# Patient Record
Sex: FEMALE | Race: OTHER | NOT HISPANIC OR LATINO | URBAN - METROPOLITAN AREA
[De-identification: names, ages, dates, MRNs, and addresses within clinical notes are randomized per-mention and may not be internally consistent; named-entity substitution may affect disease eponyms.]

---

## 2017-01-27 ENCOUNTER — INPATIENT (INPATIENT)
Facility: HOSPITAL | Age: 47
LOS: 1 days | Discharge: HOME | End: 2017-01-29
Attending: INTERNAL MEDICINE

## 2017-06-27 DIAGNOSIS — I10 ESSENTIAL (PRIMARY) HYPERTENSION: ICD-10-CM

## 2017-06-27 DIAGNOSIS — E78.5 HYPERLIPIDEMIA, UNSPECIFIED: ICD-10-CM

## 2017-06-27 DIAGNOSIS — I48.0 PAROXYSMAL ATRIAL FIBRILLATION: ICD-10-CM

## 2017-06-27 DIAGNOSIS — R07.9 CHEST PAIN, UNSPECIFIED: ICD-10-CM

## 2017-06-27 DIAGNOSIS — R07.89 OTHER CHEST PAIN: ICD-10-CM

## 2017-06-27 DIAGNOSIS — Z88.0 ALLERGY STATUS TO PENICILLIN: ICD-10-CM

## 2017-10-10 ENCOUNTER — OUTPATIENT (OUTPATIENT)
Dept: OUTPATIENT SERVICES | Facility: HOSPITAL | Age: 47
LOS: 1 days | Discharge: HOME | End: 2017-10-10

## 2017-10-10 DIAGNOSIS — M25.561 PAIN IN RIGHT KNEE: ICD-10-CM

## 2017-10-10 DIAGNOSIS — Z01.818 ENCOUNTER FOR OTHER PREPROCEDURAL EXAMINATION: ICD-10-CM

## 2017-10-10 DIAGNOSIS — I10 ESSENTIAL (PRIMARY) HYPERTENSION: ICD-10-CM

## 2017-10-10 DIAGNOSIS — R07.9 CHEST PAIN, UNSPECIFIED: ICD-10-CM

## 2017-10-16 ENCOUNTER — OUTPATIENT (OUTPATIENT)
Dept: OUTPATIENT SERVICES | Facility: HOSPITAL | Age: 47
LOS: 1 days | Discharge: HOME | End: 2017-10-16

## 2017-10-16 DIAGNOSIS — R07.9 CHEST PAIN, UNSPECIFIED: ICD-10-CM

## 2017-10-16 DIAGNOSIS — I10 ESSENTIAL (PRIMARY) HYPERTENSION: ICD-10-CM

## 2017-10-26 DIAGNOSIS — M65.861 OTHER SYNOVITIS AND TENOSYNOVITIS, RIGHT LOWER LEG: ICD-10-CM

## 2017-10-26 DIAGNOSIS — M23.261 DERANGEMENT OF OTHER LATERAL MENISCUS DUE TO OLD TEAR OR INJURY, RIGHT KNEE: ICD-10-CM

## 2017-10-26 DIAGNOSIS — E78.00 PURE HYPERCHOLESTEROLEMIA, UNSPECIFIED: ICD-10-CM

## 2017-10-26 DIAGNOSIS — Z88.0 ALLERGY STATUS TO PENICILLIN: ICD-10-CM

## 2017-10-26 DIAGNOSIS — I10 ESSENTIAL (PRIMARY) HYPERTENSION: ICD-10-CM

## 2019-10-16 ENCOUNTER — EMERGENCY (EMERGENCY)
Facility: HOSPITAL | Age: 49
LOS: 0 days | Discharge: HOME | End: 2019-10-17
Admitting: EMERGENCY MEDICINE
Payer: COMMERCIAL

## 2019-10-16 VITALS
DIASTOLIC BLOOD PRESSURE: 93 MMHG | SYSTOLIC BLOOD PRESSURE: 168 MMHG | WEIGHT: 134.92 LBS | TEMPERATURE: 97 F | RESPIRATION RATE: 18 BRPM | HEIGHT: 63 IN | HEART RATE: 87 BPM | OXYGEN SATURATION: 100 %

## 2019-10-16 DIAGNOSIS — M25.571 PAIN IN RIGHT ANKLE AND JOINTS OF RIGHT FOOT: ICD-10-CM

## 2019-10-16 DIAGNOSIS — M25.561 PAIN IN RIGHT KNEE: ICD-10-CM

## 2019-10-16 DIAGNOSIS — M25.579 PAIN IN UNSPECIFIED ANKLE AND JOINTS OF UNSPECIFIED FOOT: ICD-10-CM

## 2019-10-16 PROCEDURE — 99284 EMERGENCY DEPT VISIT MOD MDM: CPT

## 2019-10-17 LAB — D DIMER BLD IA.RAPID-MCNC: 78 NG/ML DDU — SIGNIFICANT CHANGE UP (ref 0–230)

## 2019-10-17 NOTE — ED PROVIDER NOTE - PATIENT PORTAL LINK FT
You can access the FollowMyHealth Patient Portal offered by [facility] by registering at the following website: [email or web address]. By joining [employer]’s FollowMyHealth portal, you will also be able to view your health information using other applications (apps) compatible with our system.

## 2019-10-17 NOTE — ED PROVIDER NOTE - PHYSICAL EXAMINATION
VITAL SIGNS: I have reviewed nursing notes and confirm.  CONSTITUTIONAL: Well-developed; well-nourished; in no acute distress.  SKIN: Skin exam is warm and dry, no acute rash.  HEAD: Normocephalic; atraumatic.  EYES: PERRL, EOM intact; conjunctiva and sclera clear.  ENT: No nasal discharge; airway clear. TMs clear.  NECK: Supple; non tender.  CARD: S1, S2 normal; no murmurs, gallops, or rubs. Regular rate and rhythm.  RESP: No wheezes, rales or rhonchi. Speaking in full sentences.   ABD: Normal bowel sounds; soft; non-distended; non-tender; No rebound or guarding. No CVA tenderness.  EXT: Normal ROM. (+) Mild swelling to right ankle around left lateral malleolus without deformity, ecchymosis, erythema or warmth. FROM. No right knee TTP or deformity. DP 2+  NEURO: Alert, oriented. Grossly unremarkable. No focal deficits. VITAL SIGNS: I have reviewed nursing notes and confirm.  CONSTITUTIONAL: Well-developed; well-nourished; in no acute distress.  SKIN: Skin exam is warm and dry, no acute rash.  HEAD: Normocephalic; atraumatic.  EYES: Conjunctiva and sclera clear.  CARD: S1, S2 normal; no murmurs, gallops, or rubs. Regular rate and rhythm.  RESP: No wheezes, rales or rhonchi. Speaking in full sentences.   EXT: Normal ROM. (+) Mild swelling to right ankle around left lateral malleolus without deformity, ecchymosis, erythema or warmth. FROM. No right knee TTP or deformity. DP 2+. Sensation intact.   NEURO: Alert, oriented. Grossly unremarkable. No focal deficits.

## 2019-10-17 NOTE — ED PROVIDER NOTE - NS ED ROS FT
Review of Systems  Constitutional:  No fever, chills.  Cardiac:  No chest pain, palpitations, syncope.  Resp: No SOB.   MS:  (+) right ankle and knee pain  Skin:  No skin rash, pruritis, jaundice, or lesions.  Neuro:  No headache, dizziness, loss of sensation, or focal weakness.  No change in mental status.   Endocrine: No history of thyroid disease or diabetes.

## 2019-10-17 NOTE — ED PROVIDER NOTE - CARE PROVIDER_API CALL
Stephon Alcaraz)  Orthopaedic Surgery  [address] [place] [address]  Phone: [phone]  Fax: [phone]  Follow Up Time: 4-6 Days

## 2019-10-17 NOTE — ED PROVIDER NOTE - OBJECTIVE STATEMENT
50 yo F with no significant PMHx presents to the ED c/o mild right knee and ankle pain that has been intermittent for the past few months. Pt has been following with ortho and has had normal xrays. Pt is do to get MRI but presented to ED because she noticed some swelling around her ankle and was concerned for blood clot. Pt denies other complaints. Pt denies fever, chills, chest pain, SOB, LOC, trauma, calf pain/swelling, recent travel, recent surgery.

## 2019-10-17 NOTE — ED PROVIDER NOTE - CLINICAL SUMMARY MEDICAL DECISION MAKING FREE TEXT BOX
Pt presents with right ankle swelling with concern for DVT. D-dimer normal. Pt instructed to follow-up with ortho for MRI. Return precautions provided. Pt agreeable to d/c.

## 2019-11-12 ENCOUNTER — EMERGENCY (EMERGENCY)
Facility: HOSPITAL | Age: 49
LOS: 0 days | Discharge: HOME | End: 2019-11-13
Attending: EMERGENCY MEDICINE | Admitting: EMERGENCY MEDICINE
Payer: COMMERCIAL

## 2019-11-12 VITALS
SYSTOLIC BLOOD PRESSURE: 134 MMHG | DIASTOLIC BLOOD PRESSURE: 80 MMHG | WEIGHT: 138.01 LBS | HEART RATE: 79 BPM | OXYGEN SATURATION: 100 % | RESPIRATION RATE: 18 BRPM | TEMPERATURE: 97 F | HEIGHT: 63 IN

## 2019-11-12 DIAGNOSIS — R30.0 DYSURIA: ICD-10-CM

## 2019-11-12 DIAGNOSIS — M54.5 LOW BACK PAIN: ICD-10-CM

## 2019-11-12 DIAGNOSIS — R10.31 RIGHT LOWER QUADRANT PAIN: ICD-10-CM

## 2019-11-12 DIAGNOSIS — R50.9 FEVER, UNSPECIFIED: ICD-10-CM

## 2019-11-12 LAB
BASOPHILS # BLD AUTO: 0.06 K/UL — SIGNIFICANT CHANGE UP (ref 0–0.2)
BASOPHILS NFR BLD AUTO: 1.2 % — HIGH (ref 0–1)
EOSINOPHIL # BLD AUTO: 0.15 K/UL — SIGNIFICANT CHANGE UP (ref 0–0.7)
EOSINOPHIL NFR BLD AUTO: 2.9 % — SIGNIFICANT CHANGE UP (ref 0–8)
HCT VFR BLD CALC: 41.3 % — SIGNIFICANT CHANGE UP (ref 37–47)
HGB BLD-MCNC: 13.4 G/DL — SIGNIFICANT CHANGE UP (ref 12–16)
IMM GRANULOCYTES NFR BLD AUTO: 0 % — LOW (ref 0.1–0.3)
LYMPHOCYTES # BLD AUTO: 1.48 K/UL — SIGNIFICANT CHANGE UP (ref 1.2–3.4)
LYMPHOCYTES # BLD AUTO: 28.5 % — SIGNIFICANT CHANGE UP (ref 20.5–51.1)
MCHC RBC-ENTMCNC: 27.7 PG — SIGNIFICANT CHANGE UP (ref 27–31)
MCHC RBC-ENTMCNC: 32.4 G/DL — SIGNIFICANT CHANGE UP (ref 32–37)
MCV RBC AUTO: 85.5 FL — SIGNIFICANT CHANGE UP (ref 81–99)
MONOCYTES # BLD AUTO: 0.47 K/UL — SIGNIFICANT CHANGE UP (ref 0.1–0.6)
MONOCYTES NFR BLD AUTO: 9.1 % — SIGNIFICANT CHANGE UP (ref 1.7–9.3)
NEUTROPHILS # BLD AUTO: 3.03 K/UL — SIGNIFICANT CHANGE UP (ref 1.4–6.5)
NEUTROPHILS NFR BLD AUTO: 58.3 % — SIGNIFICANT CHANGE UP (ref 42.2–75.2)
NRBC # BLD: 0 /100 WBCS — SIGNIFICANT CHANGE UP (ref 0–0)
PLATELET # BLD AUTO: 288 K/UL — SIGNIFICANT CHANGE UP (ref 130–400)
RBC # BLD: 4.83 M/UL — SIGNIFICANT CHANGE UP (ref 4.2–5.4)
RBC # FLD: 13.1 % — SIGNIFICANT CHANGE UP (ref 11.5–14.5)
WBC # BLD: 5.19 K/UL — SIGNIFICANT CHANGE UP (ref 4.8–10.8)
WBC # FLD AUTO: 5.19 K/UL — SIGNIFICANT CHANGE UP (ref 4.8–10.8)

## 2019-11-12 PROCEDURE — 99284 EMERGENCY DEPT VISIT MOD MDM: CPT

## 2019-11-12 RX ORDER — KETOROLAC TROMETHAMINE 30 MG/ML
15 SYRINGE (ML) INJECTION ONCE
Refills: 0 | Status: DISCONTINUED | OUTPATIENT
Start: 2019-11-12 | End: 2019-11-12

## 2019-11-12 RX ORDER — METHOCARBAMOL 500 MG/1
1500 TABLET, FILM COATED ORAL ONCE
Refills: 0 | Status: COMPLETED | OUTPATIENT
Start: 2019-11-12 | End: 2019-11-12

## 2019-11-12 RX ORDER — SODIUM CHLORIDE 9 MG/ML
1000 INJECTION INTRAMUSCULAR; INTRAVENOUS; SUBCUTANEOUS ONCE
Refills: 0 | Status: COMPLETED | OUTPATIENT
Start: 2019-11-12 | End: 2019-11-12

## 2019-11-12 RX ADMIN — SODIUM CHLORIDE 1000 MILLILITER(S): 9 INJECTION INTRAMUSCULAR; INTRAVENOUS; SUBCUTANEOUS at 23:39

## 2019-11-12 RX ADMIN — Medication 15 MILLIGRAM(S): at 23:35

## 2019-11-12 RX ADMIN — METHOCARBAMOL 1500 MILLIGRAM(S): 500 TABLET, FILM COATED ORAL at 23:35

## 2019-11-13 LAB
ALBUMIN SERPL ELPH-MCNC: 4.2 G/DL — SIGNIFICANT CHANGE UP (ref 3.5–5.2)
ALP SERPL-CCNC: 96 U/L — SIGNIFICANT CHANGE UP (ref 30–115)
ALT FLD-CCNC: 14 U/L — SIGNIFICANT CHANGE UP (ref 0–41)
ANION GAP SERPL CALC-SCNC: 14 MMOL/L — SIGNIFICANT CHANGE UP (ref 7–14)
APPEARANCE UR: CLEAR — SIGNIFICANT CHANGE UP
AST SERPL-CCNC: 16 U/L — SIGNIFICANT CHANGE UP (ref 0–41)
BILIRUB DIRECT SERPL-MCNC: <0.2 MG/DL — SIGNIFICANT CHANGE UP (ref 0–0.2)
BILIRUB INDIRECT FLD-MCNC: <0.2 MG/DL — SIGNIFICANT CHANGE UP (ref 0.2–1.2)
BILIRUB SERPL-MCNC: <0.2 MG/DL — SIGNIFICANT CHANGE UP (ref 0.2–1.2)
BILIRUB UR-MCNC: NEGATIVE — SIGNIFICANT CHANGE UP
BUN SERPL-MCNC: 13 MG/DL — SIGNIFICANT CHANGE UP (ref 10–20)
CALCIUM SERPL-MCNC: 9 MG/DL — SIGNIFICANT CHANGE UP (ref 8.5–10.1)
CHLORIDE SERPL-SCNC: 100 MMOL/L — SIGNIFICANT CHANGE UP (ref 98–110)
CO2 SERPL-SCNC: 24 MMOL/L — SIGNIFICANT CHANGE UP (ref 17–32)
COLOR SPEC: YELLOW — SIGNIFICANT CHANGE UP
CREAT SERPL-MCNC: 0.7 MG/DL — SIGNIFICANT CHANGE UP (ref 0.7–1.5)
DIFF PNL FLD: NEGATIVE — SIGNIFICANT CHANGE UP
GLUCOSE SERPL-MCNC: 168 MG/DL — HIGH (ref 70–99)
GLUCOSE UR QL: NEGATIVE — SIGNIFICANT CHANGE UP
HCG SERPL QL: NEGATIVE — SIGNIFICANT CHANGE UP
KETONES UR-MCNC: SIGNIFICANT CHANGE UP
LACTATE SERPL-SCNC: 2.7 MMOL/L — HIGH (ref 0.5–2.2)
LEUKOCYTE ESTERASE UR-ACNC: NEGATIVE — SIGNIFICANT CHANGE UP
LIDOCAIN IGE QN: 86 U/L — HIGH (ref 7–60)
NITRITE UR-MCNC: NEGATIVE — SIGNIFICANT CHANGE UP
PH UR: 6 — SIGNIFICANT CHANGE UP (ref 5–8)
POTASSIUM SERPL-MCNC: 3.8 MMOL/L — SIGNIFICANT CHANGE UP (ref 3.5–5)
POTASSIUM SERPL-SCNC: 3.8 MMOL/L — SIGNIFICANT CHANGE UP (ref 3.5–5)
PROT SERPL-MCNC: 7 G/DL — SIGNIFICANT CHANGE UP (ref 6–8)
PROT UR-MCNC: NEGATIVE — SIGNIFICANT CHANGE UP
SODIUM SERPL-SCNC: 138 MMOL/L — SIGNIFICANT CHANGE UP (ref 135–146)
SP GR SPEC: 1.02 — SIGNIFICANT CHANGE UP (ref 1.01–1.02)
UROBILINOGEN FLD QL: SIGNIFICANT CHANGE UP

## 2019-11-13 PROCEDURE — 74177 CT ABD & PELVIS W/CONTRAST: CPT | Mod: 26

## 2019-11-13 RX ORDER — TIZANIDINE 4 MG/1
2 TABLET ORAL
Qty: 42 | Refills: 0
Start: 2019-11-13 | End: 2019-11-19

## 2019-11-13 NOTE — ED PROVIDER NOTE - PATIENT PORTAL LINK FT
You can access the FollowMyHealth Patient Portal offered by [facility] by registering at the following website: [email or web address]. By joining [employer]’s FollowMyHealth portal, you will also be able to view your health information using other applications (apps) compatible with our system.

## 2019-11-13 NOTE — ED PROVIDER NOTE - OBJECTIVE STATEMENT
49 year old F with no pmhx c/o rt lower back pain radiating to RLQ x 2 days. Symptoms are constant without any relieving / alleviating factors. Also notes subjective fever/chills, and dysuria. No trauma/injuries, inability to ambulate, nausea, vomiting, sick contacts, chest pain, sob, recent travel, hx of renal stones/uti.

## 2019-11-13 NOTE — ED PROVIDER NOTE - NS ED ROS FT
Constitutional: no fever, chills, no recent weight loss, change in appetite or malaise  Eyes: no redness/discharge/pain/vision changes  Cardiac: No chest pain, SOB or edema.  Respiratory: No cough or respiratory distress  GI: + RLQ pain. No nausea, vomiting, diarrhea   : + dysuria. No frequency, urgency or hematuria  MS: + Rt lower back pain.  no loss of ROM, no weakness  Neuro: No headache or weakness. No LOC.  Skin: No skin rash.  Endocrine: No history of thyroid disease or diabetes.  Except as documented in the HPI, all other systems are negative.

## 2019-11-13 NOTE — ED PROVIDER NOTE - NSDCPRINTRESULTS_ED_ALL_ED
Normal rate, regular rhythm, normal S1, S2 heart sounds heard. Patient requests all Lab and Radiology Results on their Discharge Instructions

## 2019-11-13 NOTE — ED PROVIDER NOTE - ATTENDING CONTRIBUTION TO CARE
I personally evaluated the patient. I reviewed the Resident’s or Physician Assistant’s note (as assigned above), and agree with the findings and plan except as documented in my note.    49 year old F with no pmhx c/o rt lower back pain radiating to RLQ x 2 days. No dysuria or hematuria. No n/v. No flank pain. No fevers, chills.     CONSTITUTIONAL: Well-developed; well-nourished; in no acute distress. Sitting up and providing appropriate history and physical examination  SKIN: skin exam is warm and dry, no acute rash.  HEAD: Normocephalic; atraumatic.  EYES: PERRL, 3 mm bilateral, no nystagmus, EOM intact; conjunctiva and sclera clear.  ENT: No nasal discharge; airway clear.  NECK: Supple; non tender.+ full passive ROM in all directions. No JVD  CARD: S1, S2 normal; no murmurs, gallops, or rubs. Regular rate and rhythm. + Symmetric Strong Pulses  RESP: No wheezes, rales or rhonchi. Good air movement bilaterally  ABD: soft; non-distended; non-tender. No Rebound, No Gaurding, No signs of peritnitis, No CVA tenderness  EXT: Normal ROM. No clubbing, cyanosis or edema. Dp and Pt Pulses intact. Cap refill less than 3 seconds  NEURO: CN 2-12 intact, normal finger to nose, normal romberg, stable gait, no sensory or motor deficits, Alert, oriented, grossly unremarkable. No Focal deficits. GCS 15. NIH 0  PSYCH: Cooperative, appropriate.

## 2019-11-13 NOTE — ED PROVIDER NOTE - PHYSICAL EXAMINATION
CONSTITUTIONAL: Well-appearing; well-nourished; in no apparent distress.   NECK: Supple; non-tender; no cervical lymphadenopathy.   CARDIOVASCULAR: Normal S1, S2; no murmurs, rubs, or gallops.   RESPIRATORY: Normal chest excursion with respiration; breath sounds clear and equal bilaterally; no wheezes, rhonchi, or rales.  GI/: Normal bowel sounds; non-distended; non-tender; no palpable organomegaly.   MS: No evidence of trauma or deformity. + mild ttp over rt lower lumbar paraspinal region. No midline spinal ttp.  Normal ROM in all four extremities; non-tender to palpation; distal pulses are normal.   SKIN: Normal for age and race; warm; dry; good turgor; no apparent lesions or exudate.   NEURO/PSYCH: A & O x 4; grossly unremarkable. mood and manner are appropriate. Grooming and personal hygiene are appropriate. No apparent thoughts of harm to self or others.

## 2019-12-15 ENCOUNTER — EMERGENCY (EMERGENCY)
Facility: HOSPITAL | Age: 49
LOS: 0 days | Discharge: HOME | End: 2019-12-15
Attending: EMERGENCY MEDICINE | Admitting: EMERGENCY MEDICINE
Payer: COMMERCIAL

## 2019-12-15 VITALS
HEART RATE: 81 BPM | RESPIRATION RATE: 18 BRPM | OXYGEN SATURATION: 98 % | SYSTOLIC BLOOD PRESSURE: 146 MMHG | TEMPERATURE: 98 F | DIASTOLIC BLOOD PRESSURE: 90 MMHG | WEIGHT: 138.01 LBS

## 2019-12-15 VITALS
HEART RATE: 80 BPM | OXYGEN SATURATION: 99 % | SYSTOLIC BLOOD PRESSURE: 130 MMHG | TEMPERATURE: 98 F | RESPIRATION RATE: 18 BRPM | DIASTOLIC BLOOD PRESSURE: 80 MMHG

## 2019-12-15 DIAGNOSIS — R11.0 NAUSEA: ICD-10-CM

## 2019-12-15 DIAGNOSIS — R10.9 UNSPECIFIED ABDOMINAL PAIN: ICD-10-CM

## 2019-12-15 DIAGNOSIS — R10.13 EPIGASTRIC PAIN: ICD-10-CM

## 2019-12-15 LAB
ALBUMIN SERPL ELPH-MCNC: 4 G/DL — SIGNIFICANT CHANGE UP (ref 3.5–5.2)
ALP SERPL-CCNC: 73 U/L — SIGNIFICANT CHANGE UP (ref 30–115)
ALT FLD-CCNC: 15 U/L — SIGNIFICANT CHANGE UP (ref 0–41)
ANION GAP SERPL CALC-SCNC: 12 MMOL/L — SIGNIFICANT CHANGE UP (ref 7–14)
AST SERPL-CCNC: 16 U/L — SIGNIFICANT CHANGE UP (ref 0–41)
BASOPHILS # BLD AUTO: 0.06 K/UL — SIGNIFICANT CHANGE UP (ref 0–0.2)
BASOPHILS NFR BLD AUTO: 1.8 % — HIGH (ref 0–1)
BILIRUB SERPL-MCNC: 0.3 MG/DL — SIGNIFICANT CHANGE UP (ref 0.2–1.2)
BUN SERPL-MCNC: 16 MG/DL — SIGNIFICANT CHANGE UP (ref 10–20)
CALCIUM SERPL-MCNC: 8.8 MG/DL — SIGNIFICANT CHANGE UP (ref 8.5–10.1)
CHLORIDE SERPL-SCNC: 102 MMOL/L — SIGNIFICANT CHANGE UP (ref 98–110)
CO2 SERPL-SCNC: 25 MMOL/L — SIGNIFICANT CHANGE UP (ref 17–32)
CREAT SERPL-MCNC: 0.6 MG/DL — LOW (ref 0.7–1.5)
EOSINOPHIL # BLD AUTO: 0 K/UL — SIGNIFICANT CHANGE UP (ref 0–0.7)
EOSINOPHIL NFR BLD AUTO: 0 % — SIGNIFICANT CHANGE UP (ref 0–8)
GLUCOSE SERPL-MCNC: 103 MG/DL — HIGH (ref 70–99)
HCG SERPL QL: NEGATIVE — SIGNIFICANT CHANGE UP
HCT VFR BLD CALC: 42 % — SIGNIFICANT CHANGE UP (ref 37–47)
HGB BLD-MCNC: 13.4 G/DL — SIGNIFICANT CHANGE UP (ref 12–16)
LIDOCAIN IGE QN: 64 U/L — HIGH (ref 7–60)
LYMPHOCYTES # BLD AUTO: 0.73 K/UL — LOW (ref 1.2–3.4)
LYMPHOCYTES # BLD AUTO: 23 % — SIGNIFICANT CHANGE UP (ref 20.5–51.1)
MAGNESIUM SERPL-MCNC: 2.3 MG/DL — SIGNIFICANT CHANGE UP (ref 1.8–2.4)
MCHC RBC-ENTMCNC: 27.5 PG — SIGNIFICANT CHANGE UP (ref 27–31)
MCHC RBC-ENTMCNC: 31.9 G/DL — LOW (ref 32–37)
MCV RBC AUTO: 86.1 FL — SIGNIFICANT CHANGE UP (ref 81–99)
MONOCYTES # BLD AUTO: 0.56 K/UL — SIGNIFICANT CHANGE UP (ref 0.1–0.6)
MONOCYTES NFR BLD AUTO: 17.7 % — HIGH (ref 1.7–9.3)
NEUTROPHILS # BLD AUTO: 1.62 K/UL — SIGNIFICANT CHANGE UP (ref 1.4–6.5)
NEUTROPHILS NFR BLD AUTO: 51.3 % — SIGNIFICANT CHANGE UP (ref 42.2–75.2)
NRBC # BLD: 2 /100 — HIGH (ref 0–0)
NRBC # BLD: SIGNIFICANT CHANGE UP /100 WBCS (ref 0–0)
PLAT MORPH BLD: SIGNIFICANT CHANGE UP
PLATELET # BLD AUTO: 250 K/UL — SIGNIFICANT CHANGE UP (ref 130–400)
POTASSIUM SERPL-MCNC: 3.6 MMOL/L — SIGNIFICANT CHANGE UP (ref 3.5–5)
POTASSIUM SERPL-SCNC: 3.6 MMOL/L — SIGNIFICANT CHANGE UP (ref 3.5–5)
PROT SERPL-MCNC: 6.9 G/DL — SIGNIFICANT CHANGE UP (ref 6–8)
RBC # BLD: 4.88 M/UL — SIGNIFICANT CHANGE UP (ref 4.2–5.4)
RBC # FLD: 13.3 % — SIGNIFICANT CHANGE UP (ref 11.5–14.5)
RBC BLD AUTO: NORMAL — SIGNIFICANT CHANGE UP
SODIUM SERPL-SCNC: 139 MMOL/L — SIGNIFICANT CHANGE UP (ref 135–146)
VARIANT LYMPHS # BLD: 6.2 % — HIGH (ref 0–5)
WBC # BLD: 3.16 K/UL — LOW (ref 4.8–10.8)
WBC # FLD AUTO: 3.16 K/UL — LOW (ref 4.8–10.8)

## 2019-12-15 PROCEDURE — 99284 EMERGENCY DEPT VISIT MOD MDM: CPT

## 2019-12-15 PROCEDURE — 76705 ECHO EXAM OF ABDOMEN: CPT | Mod: 26

## 2019-12-15 RX ORDER — ONDANSETRON 8 MG/1
4 TABLET, FILM COATED ORAL ONCE
Refills: 0 | Status: COMPLETED | OUTPATIENT
Start: 2019-12-15 | End: 2019-12-15

## 2019-12-15 RX ORDER — FAMOTIDINE 10 MG/ML
20 INJECTION INTRAVENOUS ONCE
Refills: 0 | Status: COMPLETED | OUTPATIENT
Start: 2019-12-15 | End: 2019-12-15

## 2019-12-15 RX ORDER — FAMOTIDINE 10 MG/ML
1 INJECTION INTRAVENOUS
Qty: 6 | Refills: 0
Start: 2019-12-15 | End: 2019-12-17

## 2019-12-15 RX ORDER — SODIUM CHLORIDE 9 MG/ML
1000 INJECTION, SOLUTION INTRAVENOUS ONCE
Refills: 0 | Status: COMPLETED | OUTPATIENT
Start: 2019-12-15 | End: 2019-12-15

## 2019-12-15 RX ADMIN — ONDANSETRON 4 MILLIGRAM(S): 8 TABLET, FILM COATED ORAL at 15:45

## 2019-12-15 RX ADMIN — FAMOTIDINE 104 MILLIGRAM(S): 10 INJECTION INTRAVENOUS at 15:45

## 2019-12-15 RX ADMIN — SODIUM CHLORIDE 1000 MILLILITER(S): 9 INJECTION, SOLUTION INTRAVENOUS at 15:44

## 2019-12-15 NOTE — ED PROVIDER NOTE - PATIENT PORTAL LINK FT
You can access the FollowMyHealth Patient Portal offered by [facility] by registering at the following website: [email or web address]. By joining [employer]’s FollowMyHealth portal, you will also be able to view your health information using other applications (apps) compatible with our system.

## 2019-12-15 NOTE — ED PROVIDER NOTE - OBJECTIVE STATEMENT
49y F no pmh prsents for eval of abd pain. Pt states she has mild burning epigastric pain x2 days, no aggravating or relieving factors. Associated nausea. Denies ha, fever, cp, sob, weakness, numbness

## 2019-12-15 NOTE — ED PROVIDER NOTE - PROGRESS NOTE DETAILS
Attending Note: 50 y/o F no PMHx p/w abd pain x 3 days. Pt states pain started Friday, 12/13, after she got home from work. Also experienced some vomiting Friday night. Pt describes pain as constant. Decreased PO intake. PE: Well appearing, awake and alert. Cardio – S1S2. Resp - CTAB. Abdomen – (+) TTP upper gastric, RUQ. Ext- no calf swelling. Neuro – grossly unremarkable. Plan: US, labs. Results d/w patient and family and copies of results provided.  Pt instructed to return if any worsening symptoms or concerns.  They verbalize understanding.

## 2019-12-15 NOTE — ED PROVIDER NOTE - PHYSICAL EXAMINATION
CONST: NAD  EYES: PERRL, EOMI, Sclera and conjunctiva clear.   ENT: No nasal discharge. Oropharynx normal appearing, no erythema or exudates. No abscess or swelling. Uvula midline.   NECK: Non-tender, no meningeal signs. normal ROM. supple   CARD: S1 S2; No jvd  RESP: Equal BS B/L, No wheezes, rhonchi or rales. No distress  GI: Soft, non-tender, non-distended. normal BS  MS: Normal ROM in all extremities. pulses 2 +. no calf tenderness or swelling  SKIN: Warm, dry, no acute rashes. Good turgor  NEURO: A&Ox3

## 2019-12-15 NOTE — ED PROVIDER NOTE - CLINICAL SUMMARY MEDICAL DECISION MAKING FREE TEXT BOX
pt presented for upper abd pain.  labs wnl, ruq sono with no cholecystitis. pt felt better after dc home with antiacids ottp f/u

## 2019-12-15 NOTE — ED PROVIDER NOTE - NS ED ROS FT
Constitutional: (-) fever  Eyes/ENT: (-) blurry vision, (-) epistaxis  Cardiovascular: (-) chest pain, (-) syncope  Respiratory: (-) cough, (-) shortness of breath  Gastrointestinal: (+) epigastric pain, (+) nausea, (-) vomiting, (-) diarrhea  Musculoskeletal: (-) neck pain, (-) back pain, (-) joint pain  Integumentary: (-) rash, (-) edema  Neurological: (-) headache, (-) altered mental status  Allergic/Immunologic: (-) pruritus

## 2019-12-15 NOTE — ED PROVIDER NOTE - NSFOLLOWUPINSTRUCTIONS_ED_ALL_ED_FT
Follow up with PMD and GI in 1-2 days.    Abdominal Pain    Many things can cause abdominal pain. Usually, abdominal pain is not caused by a disease and will improve without treatment. Your health care provider will do a physical exam and possibly order blood tests and imaging to help determine the seriousness of your pain. However, in many cases, no cause may be found and you may need further testing as an outpatient. Monitor your abdominal pain for any changes.     SEEK IMMEDIATE MEDICAL CARE IF YOU HAVE THE FOLLOWING SYMPTOMS: worsening abdominal pain, vomiting, diarrhea, inability to have bowel movements or pass gas, black or bloody stool, fever accompanying chest pain or back pain, or dizziness/lightheadedness.

## 2019-12-15 NOTE — ED ADULT NURSE NOTE - OBJECTIVE STATEMENT
pt presents with epigastric pain x 2 days with nausea, vomiting and subjective fever. pt denies chest pain, SOB, or urinary symptoms.

## 2019-12-15 NOTE — ED PROVIDER NOTE - CARE PROVIDER_API CALL
Perlita Rodriguez)  Internal Medicine  [address] [place] [address]  Phone: [phone]  Fax: [phone]  Follow Up Time:

## 2019-12-15 NOTE — ED ADULT NURSE REASSESSMENT NOTE - NS ED NURSE REASSESS COMMENT FT1
pt denies nausea or abdominal pain at this time. No episodes of vomiting noted in the ED since pt's arrival. Safety maintained, will continue to monitor

## 2019-12-25 LAB
ANISOCYTOSIS BLD QL: SLIGHT — SIGNIFICANT CHANGE UP
GIANT PLATELETS BLD QL SMEAR: PRESENT — SIGNIFICANT CHANGE UP
POLYCHROMASIA BLD QL SMEAR: SLIGHT — SIGNIFICANT CHANGE UP
SMUDGE CELLS # BLD: PRESENT — SIGNIFICANT CHANGE UP

## 2020-05-28 PROBLEM — Z78.9 OTHER SPECIFIED HEALTH STATUS: Chronic | Status: ACTIVE | Noted: 2019-12-15

## 2020-05-28 PROBLEM — Z00.00 ENCOUNTER FOR PREVENTIVE HEALTH EXAMINATION: Status: ACTIVE | Noted: 2020-05-28

## 2020-06-12 ENCOUNTER — APPOINTMENT (OUTPATIENT)
Dept: CARDIOLOGY | Facility: CLINIC | Age: 50
End: 2020-06-12

## 2020-06-16 ENCOUNTER — APPOINTMENT (OUTPATIENT)
Dept: BREAST CENTER | Facility: CLINIC | Age: 50
End: 2020-06-16
Payer: COMMERCIAL

## 2020-06-16 VITALS
WEIGHT: 128 LBS | HEIGHT: 63 IN | TEMPERATURE: 98.4 F | BODY MASS INDEX: 22.68 KG/M2 | DIASTOLIC BLOOD PRESSURE: 74 MMHG | SYSTOLIC BLOOD PRESSURE: 118 MMHG

## 2020-06-16 DIAGNOSIS — Q83.1 ACCESSORY BREAST: ICD-10-CM

## 2020-06-16 DIAGNOSIS — R92.8 OTHER ABNORMAL AND INCONCLUSIVE FINDINGS ON DIAGNOSTIC IMAGING OF BREAST: ICD-10-CM

## 2020-06-16 DIAGNOSIS — N60.02 SOLITARY CYST OF LEFT BREAST: ICD-10-CM

## 2020-06-16 DIAGNOSIS — Z78.9 OTHER SPECIFIED HEALTH STATUS: ICD-10-CM

## 2020-06-16 PROCEDURE — 99213 OFFICE O/P EST LOW 20 MIN: CPT

## 2020-06-17 PROBLEM — Z78.9 NO PERTINENT PAST MEDICAL HISTORY: Status: RESOLVED | Noted: 2020-06-17 | Resolved: 2020-06-17

## 2020-06-17 PROBLEM — Z78.9 NO PERTINENT PAST SURGICAL HISTORY: Status: RESOLVED | Noted: 2020-06-17 | Resolved: 2020-06-17

## 2020-06-17 PROBLEM — Z78.9 NO HISTORY OF ALCOHOL USE: Status: ACTIVE | Noted: 2020-06-17

## 2020-06-17 NOTE — HISTORY OF PRESENT ILLNESS
[FreeTextEntry1] : Serenity is a 50 F with a L breast complicated cyst and accessory breast tissue in L axilla. \par \par Her work up was as follows: \par 2020 -- b/l screening mammogram and b/l US \par -extremely dense breasts \par -no suspicious mass, architectural distortion, or calcifications in either breast \par b/l US \par -no suspicious solid/complex cystic mass b/l\par -LEFT @2N5, 0.6 cm anechoic lesion with the appearance of internal debris, suggestive of a complicated cyst \par BIRADS 2\par \par Of note, she does report b/l cyclical breast pain but denies palpating any abnormal masses in either breast.  She denies any nipple discharge or retraction, but does have accessory breast tissue in in her left axilla which also gets swollen and painful during her menses.  \par \par HISTORICAL RISK FACTORS: \par -no prior breast biopsies or surgeries \par -no family history of breast or ovarian cancer \par -, age at first live birth was 28 \par -no prior OCP use \par -no gyn surgeries\par

## 2020-06-17 NOTE — ASSESSMENT
[FreeTextEntry1] : Serenity is a 50 F with a left breast complicated cyst and left accessory breast tissue in axilla. \par \par On exam, she had b/l nondiscrete nodularities palpated throughout both breasts, but no suspicious masses palpated within either breast.  She did have accessory breast tissue b/l axillae, however it is more pronounced on her left axilla. \par \par Her most recent imaging was a b/l screening mammogram and US on 5/19/2020 which revealed in her left breast @2N5, a 0.6 cm complicated cyst, deemed BIRADS 2. \par \par Although her left breast lesion is deemed BIRADS 2, I would like to repeat her left breast US in 6 months to ensure there are no changes to this lesion.  This will be scheduled for her today. \par \par We discussed breast cysts. They are not pre-malignant nor do they have malignant potential and are hormonally influenced.  They may grow or shrink in size as well as resolve spontaneously and there is usually no intervention unless they are symptomatic.  In several large studies, patients with breast cysts and a positive family history had a higher relative risk of breast cancer (from 1.5 to 3).  She is asymptomatic from her left breast cyst so no intervention will be performed at this time.\par \par In regards to the accessory breast tissue in her left axilla, because this is causing her pain, I have referred her to plastic surgery, Dr. Miller for possible surgical excision. \par \par In regards to her breast pain, it may be related to fibrocystic changes within her breast that are hormonally influenced. We spoke about possible interventions including evening primrose oil, supportive bras, and decreasing caffeine intake.  Although none of these have been consistently proven to improve breast pain, they may be tried.  If the pain becomes very severe, there have been studies of tamoxifen being effective for the treatment of breast pain, although there are risks with tamoxifen.  At this time she will try supportive measures.\par \par We discussed dense breasts.  Increasing breast density has been found to increase ones risk of breast cancer, but at this time, there is no clear indication for additional imaging in this setting, as both US and MRI have not been found to improve survival.  One can consider bilateral screening US.  However, out of 1000 women screened, the use of routine US will only identify an additional 3-5 cancers.  The use of US was found to increase the likelihood of undergoing more imaging and more biopsies.  She does have dense breasts.  We have decided to proceed with screening bilateral breast US at this time.  This will be scheduled with her next screening mammogram.\par \par She is otherwise at an average risk for breast cancer and should continue with annual screening mammograms and US. \par \par All of her questions were answered.  She knows to call with any further questions or concerns. \par \par PLAN: \par -L breast US On 11/19/2020 \par -f/up after \par -plastic surgery referral for possible excision of left axillary accessory breast tissue

## 2020-06-17 NOTE — PAST MEDICAL HISTORY
[Menstruating] : The patient is menstruating [Menarche Age ____] : age at menarche was [unfilled] [History of Hormone Replacement Treatment] : has no history of hormone replacement treatment [Definite ___ (Date)] : the last menstrual period was [unfilled] [Regular Cycle Intervals] : have been regular [Normal Amount/Duration] : it was of a normal amount and duration [Total Preg ___] : G[unfilled] [Live Births ___] : P[unfilled]  [Age At Live Birth ___] : Age at live birth: [unfilled] [FreeTextEntry6] : denies [FreeTextEntry5] : denies [FreeTextEntry7] : denies [FreeTextEntry8] : yes x 6 months

## 2020-06-17 NOTE — REVIEW OF SYSTEMS
[Fever] : no fever [Chills] : no chills [Abn Vaginal Bleeding] : no unexplained vaginal bleeding [As Noted in HPI] : as noted in HPI [Skin Wound] : no skin wound [Skin Lesions] : no skin lesions [Breast Pain] : breast pain [Breast Lump] : no breast lump [Hot Flashes] : no hot flashes [Negative] : Psychiatric

## 2020-06-17 NOTE — DATA REVIEWED
[FreeTextEntry1] : 	\par Exam requested by:\par PRINCESS KATZ MD\par [address]\par [facility] [id number]\par SITE PERFORMED: [address]\par SITE PHONE: [phone]\par Patient: CYNDI CROWE\par YOB: 1970\par Phone: [phone]\par MRN: [id number] Acc: [id number]\par Date of Exam: 05-\par  \par EXAM:  DIGITAL BILATERAL SCREENING MAMMOGRAM WITH CAD AND TOMOSYNTHESIS AND BREAST ULTRASOUND\par \par HISTORY:  The patient is 50 years old and is seen for a screening mammogram. There is no personal history of breast cancer. Family history of breast cancer: None. \par \par CLINICAL BREAST EXAMINATION:  The patient reports that her last clinical breast exam was within the past year. \par \par COMPARISON:  The present examination has been compared to prior breast imaging studies dating back to 6/2/2017\par \par MAMMOGRAM:\par \par TECHNIQUE:  The following views were obtained digitally: bilateral craniocaudal, bilateral mediolateral oblique. Low-dose full-field digital breast tomosynthesis examination was performed with 3D acquisitions and C-View synthesized 2D reconstructed images. Computer-aided detection (CAD) was utilized. \par \par FINDINGS: \par BREAST COMPOSITION:  The breasts are extremely dense, which lowers the sensitivity of mammography.\par \par No suspicious masses, architectural distortion, or significant calcifications are detected.\par \par BREAST ULTRASOUND:\par \par HISTORY:  Supplemental screening evaluation of dense breasts.\par \par TECHNIQUE:  A bilateral breast ultrasound was performed with complete evaluation of the four quadrants, retroareolar region, and axilla. \par \par FINDINGS: \par BREAST ECHOTEXTURE:  There is a heterogeneous background echotexture. \par \par No suspicious solid or complex cystic mass is detected in either breast. There is no pathological lymphadenopathy in either axilla. There is a 0.6 cm horizontally oriented anechoic lesion with the appearance of internal debris in the left breast 2 o'clock position 5 cm from the nipple suggestive of a complicated cyst. \par \par IMPRESSION:  No mammographic or ultrasonographic evidence of malignancy. \par \par FOLLOW-UP:  Annual screening.\par \par \par ASSESSMENT:  BI-RADS Category 2:  Benign. \par \par This examination should not preclude the clinical evaluation of a suspicious palpable abnormality.\par \par As per the FDA requirements, a layman's letter has been generated and sent to your patient stating the results and recommendations of this breast imaging study. We have entered your patient into our reminder system and will notify them when they are due for their next breast imaging exam. \par \par Thank you for the opportunity to participate in the care of this patient.  \par  \par ZEYAD EMMANUEL MD  - Electronically Signed: 05- 10:27 AM \par Physician to Physician Direct Line is: [phone]

## 2020-06-17 NOTE — PHYSICAL EXAM
[No dominant masses] : no dominant masses in right breast  [No dominant masses] : no dominant masses left breast [No Nipple Retraction] : no left nipple retraction [No Nipple Discharge] : no left nipple discharge [Normocephalic] : normocephalic [EOMI] : extra ocular movement intact [Atraumatic] : atraumatic [No Supraclavicular Adenopathy] : no supraclavicular adenopathy [No Cervical Adenopathy] : no cervical adenopathy [Examined in the supine and seated position] : examined in the supine and seated position [No Axillary Lymphadenopathy] : no right axillary lymphadenopathy [Not Tender] : non-tender [Soft] : abdomen soft [No Edema] : no edema [No Ulceration] : no ulceration [No Rashes] : no rashes [de-identified] : b/l nonspecific nodularities palpated; but no suspicious masses palpated within either breast; accessory breast tissue in b/l axillae but more pronounced on the left side [de-identified] : acessory breast tissue, no suspicious mass palpated

## 2020-10-08 ENCOUNTER — EMERGENCY (EMERGENCY)
Facility: HOSPITAL | Age: 50
LOS: 0 days | Discharge: HOME | End: 2020-10-08
Attending: EMERGENCY MEDICINE | Admitting: EMERGENCY MEDICINE
Payer: COMMERCIAL

## 2020-10-08 VITALS
WEIGHT: 138.01 LBS | HEIGHT: 63 IN | SYSTOLIC BLOOD PRESSURE: 147 MMHG | OXYGEN SATURATION: 99 % | TEMPERATURE: 98 F | DIASTOLIC BLOOD PRESSURE: 90 MMHG | RESPIRATION RATE: 20 BRPM | HEART RATE: 73 BPM

## 2020-10-08 VITALS
SYSTOLIC BLOOD PRESSURE: 137 MMHG | RESPIRATION RATE: 17 BRPM | OXYGEN SATURATION: 98 % | DIASTOLIC BLOOD PRESSURE: 76 MMHG | HEART RATE: 87 BPM
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
## 2020-10-08 DIAGNOSIS — T78.40XA ALLERGY, UNSPECIFIED, INITIAL ENCOUNTER: ICD-10-CM

## 2020-10-08 DIAGNOSIS — R06.02 SHORTNESS OF BREATH: ICD-10-CM

## 2020-10-08 DIAGNOSIS — T88.6XXA ANAPHYLACTIC REACTION DUE TO ADVERSE EFFECT OF CORRECT DRUG OR MEDICAMENT PROPERLY ADMINISTERED, INITIAL ENCOUNTER: ICD-10-CM
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
## 2020-10-08 PROCEDURE — 99285 EMERGENCY DEPT VISIT HI MDM: CPT

## 2020-10-08 RX ORDER — DEXAMETHASONE 0.5 MG/5ML
10 ELIXIR ORAL ONCE
Refills: 0 | Status: COMPLETED | OUTPATIENT
Start: 2020-10-08 | End: 2020-10-08

## 2020-10-08 RX ORDER — FAMOTIDINE 10 MG/ML
20 INJECTION INTRAVENOUS ONCE
Refills: 0 | Status: COMPLETED | OUTPATIENT
Start: 2020-10-08 | End: 2020-10-08

## 2020-10-08 RX ORDER — EPINEPHRINE 0.3 MG/.3ML
0.3 INJECTION INTRAMUSCULAR; SUBCUTANEOUS ONCE
Refills: 0 | Status: COMPLETED | OUTPATIENT
Start: 2020-10-08 | End: 2020-10-08

## 2020-10-08 RX ORDER — EPINEPHRINE 0.3 MG/.3ML
0.3 INJECTION INTRAMUSCULAR; SUBCUTANEOUS
Qty: 1 | Refills: 0
Start: 2020-10-08

## 2020-10-08 RX ADMIN — FAMOTIDINE 20 MILLIGRAM(S): 10 INJECTION INTRAVENOUS at 02:25

## 2020-10-08 RX ADMIN — EPINEPHRINE 0.3 MILLIGRAM(S): 0.3 INJECTION INTRAMUSCULAR; SUBCUTANEOUS at 02:25

## 2020-10-08 RX ADMIN — Medication 10 MILLIGRAM(S): at 02:23

## 2020-10-08 NOTE — ED PROVIDER NOTE - OBJECTIVE STATEMENT
50y F w/ no sig PMH presents with allergic reaction after taking meloxicam. Acute onset after medication initiation. Facial swelling along with pruritis. No alleviating/worsening factors. Took 2 pills of benadryl prior to arrival. + SOB. Denies fever, chills, CP, abd pain, n/v/d, or numbness/tingling.

## 2020-10-08 NOTE — ED PROVIDER NOTE - NS ED ROS FT
Eyes:  No visual changes, eye pain or discharge.  ENMT:  No hearing changes, pain, no sore throat or runny nose, no difficulty swallowing  Cardiac:  No chest pain, edema. No chest pain with exertion. + SOB  Respiratory:  No cough or respiratory distress. No hemoptysis. No history of asthma or RAD.  GI:  No nausea, vomiting, diarrhea or abdominal pain.  :  No dysuria, frequency or burning.  MS:  No myalgia, muscle weakness, joint pain or back pain.  Neuro:  No headache or weakness.  No LOC.  Skin:  + skin rash.   Endocrine: No history of thyroid disease or diabetes.

## 2020-10-08 NOTE — ED PROVIDER NOTE - PHYSICAL EXAMINATION
CONSTITUTIONAL: Well-developed; well-nourished; in no acute distress.   SKIN: warm, dry  HEAD: Normocephalic; atraumatic.  EYES: PERRL, EOMI, no conjunctival erythema  ENT: No nasal discharge; airway clear. Mild edema of the upper, lower lips. No uvular edema.   NECK: Supple; non tender.  CARD: S1, S2 normal; no murmurs, gallops, or rubs. Regular rate and rhythm.   RESP: No wheezes, rales or rhonchi.  ABD: soft ntnd  EXT: Normal ROM.  No clubbing, cyanosis or edema.   LYMPH: No acute cervical adenopathy.  NEURO: Alert, oriented, grossly unremarkable  PSYCH: Cooperative, appropriate.

## 2020-10-08 NOTE — ED ADULT NURSE NOTE - OBJECTIVE STATEMENT
Tiffany is a 50y year old female presenting with an allergic reaction. Patient is presented with facial swelling along with pruritis. Before arrival patient took 2 pills of benadryl prior to arrival. No shortness of breath.

## 2020-10-08 NOTE — ED PROVIDER NOTE - PROVIDER TOKENS
PROVIDER:[TOKEN:[[id number]],FOLLOWUP:[1-3 Days]],FREE:[LAST:[YOUR PRIMARY CARE PHYSICIAN],PHONE:[(   )    -],FAX:[(   )    -],FOLLOWUP:[1-3 Days]]

## 2020-10-08 NOTE — ED PROVIDER NOTE - ATTENDING CONTRIBUTION TO CARE
I personally evaluated the patient. I reviewed the Resident’s or Physician Assistant’s note (as assigned above), and agree with the findings and plan except as documented in my note.    50y F w/ no sig PMH presents with allergic reaction after taking meloxicam. Reports facial swelling. No trouble swallowing. No drooling. No stridor.     CONSTITUTIONAL: Well-developed; well-nourished; in no acute distress. Sitting up and providing appropriate history and physical examination  SKIN: skin exam is warm and dry, no acute rash.  HEAD: Normocephalic; atraumatic.  EYES: PERRL, 3 mm bilateral, no nystagmus, EOM intact; conjunctiva and sclera clear.  ENT: Minimal periorbital edema. No tongue swelling. Uvula midline. No nasal discharge; airway clear.  NECK: Supple; non tender.+ full passive ROM in all directions. No JVD  CARD: S1, S2 normal; no murmurs, gallops, or rubs. Regular rate and rhythm. + Symmetric Strong Pulses  RESP: No wheezes, rales or rhonchi. Good air movement bilaterally  ABD: soft; non-distended; non-tender. No Rebound, No Gaurding, No signs of peritnitis, No CVA tenderness  EXT: Normal ROM. No clubbing, cyanosis or edema. Dp and Pt Pulses intact. Cap refill less than 3 seconds  NEURO: CN 2-12 intact, normal finger to nose, normal romberg, stable gait, no sensory or motor deficits, Alert, oriented, grossly unremarkable. No Focal deficits. GCS 15. NIH 0  PSYCH: Cooperative, appropriate.    Plan-epi IM, decadron, pepcid, reassess. Will observe for 3-4 hrs and reassess

## 2020-10-08 NOTE — ED PROVIDER NOTE - PROGRESS NOTE DETAILS
Pt feels much improved. Symptoms resolved. Will cont to monitor. Pt continues to remain asymptomatic. Instructed pt to f/u with allergist. Will send home with prescription for epipen. Full DC instructions discussed and patient knows when to seek immediate medical attention. Patient has proper follow-up. All results discussed with the patient they may require further work-up. Limitations of ED work-up discussed. All  questions and concerns from patient or family addressed. Understanding of insturctions verbalized

## 2020-10-08 NOTE — ED PROVIDER NOTE - CARE PROVIDER_API CALL
Miri Salas  ALLERGY AND IMMUNOLOGY  [address] [place] [address]  Phone: [phone]  Fax: [phone]  Follow Up Time: 1-3 Days    YOUR PRIMARY CARE PHYSICIAN,   Phone: (   )    -  Fax: (   )    -  Follow Up Time: 1-3 Days

## 2020-10-08 NOTE — ED ADULT TRIAGE NOTE - CHIEF COMPLAINT QUOTE
pt reports taking meloxicam today for the first time and is now having an allergic reaction   pt presents with facial swelling, hives and c.o. throat tightening and itching

## 2021-01-29 ENCOUNTER — APPOINTMENT (OUTPATIENT)
Dept: BREAST CENTER | Facility: CLINIC | Age: 51
End: 2021-01-29

## 2024-01-01 NOTE — ED ADULT TRIAGE NOTE - AS HEIGHT TYPE
For more information on Synagis risk factors, visit: https://publications.aap.org/redbook/book/347/chapter/5219662/Respiratory-Syncytial-Virus
stated

## 2024-08-20 NOTE — ED ADULT TRIAGE NOTE - SPO2 (%)
mobility deficits, and analyze and address ROM deficits to address functional deficits and attain remaining goals.    Progress toward goals / Updated goals:  []  See Progress Note/Recertification    New Goals to be achieved in __5__ WEEKS  1.Pt will improve L shoulder ER strength to at least 4-/5 to improve ability to perform ADLs  AT PN: remains: 3+/5, 24  Current: 4-/5 24  2. Improve L serratus and mid trap strength to at least 4-/5 to improve scapular stability for lifting/carrying  AT PN: remains: mid trap 3+/5, serratus 3+/5. 24  Current: remains: mid trap 3+/5, serratus 3+/5. 8  3. Improve NDI by >/= 15% to indicate improved function  AT PN:  progressin% 24     Next PN/ RC due 24  Auth due 8th visit, expiration: 24    PLAN  - Continue Plan of Care    Fer Foote PT    2024    4:10 PM  If an interpreting service was utilized for treatment of this patient, the contents of this document represent the material reviewed with the patient via the .     Future Appointments   Date Time Provider Department Center   2024  3:00 PM Fer Foote PT [facility]   2024  2:30 PM Aishwarya Arce NP-C [facility] DEP        
100

## 2024-09-09 NOTE — ED PROVIDER NOTE - PATIENT PORTAL LINK FT
No protocol for requested medication.    Medication: oxycodone 10 mg imm. Release.  Last office visit date: 3/15/24  Pharmacy: [id number] - [facility] - [place], [place] - [address]    Order pended, routed to clinician for review.    You can access the FollowMyHealth Patient Portal offered by [facility] by registering at the following website: [email or web address]. By joining [facility]’s FollowMyHealth portal, you will also be able to view your health information using other applications (apps) compatible with our system.